# Patient Record
Sex: MALE | Race: WHITE | NOT HISPANIC OR LATINO | URBAN - NONMETROPOLITAN AREA
[De-identification: names, ages, dates, MRNs, and addresses within clinical notes are randomized per-mention and may not be internally consistent; named-entity substitution may affect disease eponyms.]

---

## 2023-10-26 ENCOUNTER — LAB OUTSIDE AN ENCOUNTER (OUTPATIENT)
Dept: URBAN - NONMETROPOLITAN AREA CLINIC 13 | Facility: CLINIC | Age: 73
End: 2023-10-26

## 2023-10-26 ENCOUNTER — OFFICE VISIT (OUTPATIENT)
Dept: URBAN - NONMETROPOLITAN AREA CLINIC 13 | Facility: CLINIC | Age: 73
End: 2023-10-26
Payer: MEDICARE

## 2023-10-26 VITALS
BODY MASS INDEX: 23.88 KG/M2 | SYSTOLIC BLOOD PRESSURE: 153 MMHG | HEIGHT: 73 IN | DIASTOLIC BLOOD PRESSURE: 83 MMHG | WEIGHT: 180.2 LBS | HEART RATE: 100 BPM

## 2023-10-26 DIAGNOSIS — D50.9 IRON DEFICIENCY ANEMIA, UNSPECIFIED IRON DEFICIENCY ANEMIA TYPE: ICD-10-CM

## 2023-10-26 PROBLEM — 87522002: Status: ACTIVE | Noted: 2023-10-26

## 2023-10-26 PROCEDURE — 99204 OFFICE O/P NEW MOD 45 MIN: CPT | Performed by: INTERNAL MEDICINE

## 2023-10-26 RX ORDER — PANTOPRAZOLE SODIUM 40 MG
TAKE 1 TABLET (40 MG) BY ORAL ROUTE ONCE DAILY TABLET, DELAYED RELEASE (ENTERIC COATED) ORAL 1
Qty: 0 | Refills: 0 | Status: ACTIVE | COMMUNITY
Start: 1900-01-01 | End: 1900-01-01

## 2023-10-26 RX ORDER — TIZANIDINE HYDROCHLORIDE 4 MG/1
CAPSULE ORAL
Qty: 0 | Refills: 0 | Status: ACTIVE | COMMUNITY
Start: 1900-01-01 | End: 1900-01-01

## 2023-10-26 RX ORDER — PHENYLEPHRINE HCL 10 MG
TABLET ORAL
Qty: 0 | Refills: 0 | Status: ACTIVE | COMMUNITY
Start: 1900-01-01 | End: 1900-01-01

## 2023-10-26 RX ORDER — TAMSULOSIN HYDROCHLORIDE 0.4 MG/1
CAPSULE ORAL
Qty: 0 | Refills: 0 | Status: ACTIVE | COMMUNITY
Start: 1900-01-01 | End: 1900-01-01

## 2023-10-26 RX ORDER — LORAZEPAM 0.5 MG/1
TABLET ORAL
Qty: 0 | Refills: 0 | Status: ACTIVE | COMMUNITY
Start: 1900-01-01 | End: 1900-01-01

## 2023-10-26 RX ORDER — SUCRALFATE 1 G/1
TAKE 1 TABLET (1 GRAM) BY ORAL ROUTE 4 TIMES PER DAY ON AN EMPTY STOMACH 1 HOUR BEFORE MEALS AND AT BEDTIME TABLET ORAL
Qty: 0 | Refills: 0 | Status: ACTIVE | COMMUNITY
Start: 1900-01-01 | End: 1900-01-01

## 2023-10-26 RX ORDER — HYDROCODONE/ACETAMINOPHEN 10MG-325MG
TABLET ORAL
Qty: 0 | Refills: 0 | Status: ACTIVE | COMMUNITY
Start: 1900-01-01 | End: 1900-01-01

## 2023-10-26 NOTE — HPI-TODAY'S VISIT:
Mr. Rubin is a very pleasant 73-year-old gentleman referred by Dr. Salvador Mason for iron deficiency anemia.  A copy of records will be sent to his office.  Recent blood work suggested low iron.  His hemoglobin is approximately 10.  He has not had any overt bleeding.  He was placed on iron in the past but had to stop this due to nausea.  He has a history of peptic ulcer disease.  Colonoscopy 2016 along with EGD only showed ulcers.  He otherwise feels well.  His appetite is good.  His weight is stable.  He did just undergo knee replacement and is working through this

## 2024-01-03 ENCOUNTER — OFFICE VISIT (OUTPATIENT)
Dept: URBAN - NONMETROPOLITAN AREA SURGERY CENTER 1 | Facility: SURGERY CENTER | Age: 74
End: 2024-01-03
Payer: MEDICARE

## 2024-01-03 ENCOUNTER — CLAIMS CREATED FROM THE CLAIM WINDOW (OUTPATIENT)
Dept: URBAN - METROPOLITAN AREA CLINIC 4 | Facility: CLINIC | Age: 74
End: 2024-01-03
Payer: MEDICARE

## 2024-01-03 DIAGNOSIS — K31.89 OTHER DISEASES OF STOMACH AND DUODENUM: ICD-10-CM

## 2024-01-03 DIAGNOSIS — K31.89 ACHYLIA: ICD-10-CM

## 2024-01-03 DIAGNOSIS — D50.9 IRON DEFICIENCY ANEMIA: ICD-10-CM

## 2024-01-03 DIAGNOSIS — K57.30 DIVERTICULOSIS OF COLON: ICD-10-CM

## 2024-01-03 DIAGNOSIS — D50.9 ANEMIA: ICD-10-CM

## 2024-01-03 PROCEDURE — 00813 ANES UPR LWR GI NDSC PX: CPT | Performed by: NURSE ANESTHETIST, CERTIFIED REGISTERED

## 2024-01-03 PROCEDURE — 45378 DIAGNOSTIC COLONOSCOPY: CPT | Performed by: INTERNAL MEDICINE

## 2024-01-03 PROCEDURE — 43239 EGD BIOPSY SINGLE/MULTIPLE: CPT | Performed by: INTERNAL MEDICINE

## 2024-01-03 PROCEDURE — G8907 PT DOC NO EVENTS ON DISCHARG: HCPCS | Performed by: INTERNAL MEDICINE

## 2024-01-03 PROCEDURE — 88305 TISSUE EXAM BY PATHOLOGIST: CPT | Performed by: PATHOLOGY
